# Patient Record
Sex: FEMALE | Race: WHITE | Employment: FULL TIME | URBAN - METROPOLITAN AREA
[De-identification: names, ages, dates, MRNs, and addresses within clinical notes are randomized per-mention and may not be internally consistent; named-entity substitution may affect disease eponyms.]

---

## 2018-01-09 ENCOUNTER — HOSPITAL ENCOUNTER (OUTPATIENT)
Dept: MAMMOGRAPHY | Age: 58
Discharge: HOME | End: 2018-01-09
Attending: OBSTETRICS & GYNECOLOGY | Admitting: OBSTETRICS & GYNECOLOGY

## 2018-01-09 DIAGNOSIS — Z12.31 VISIT FOR SCREENING MAMMOGRAM: ICD-10-CM

## 2021-09-07 ENCOUNTER — HOSPITAL ENCOUNTER (OUTPATIENT)
Dept: MRI IMAGING | Age: 61
Discharge: HOME | End: 2021-09-07
Payer: COMMERCIAL

## 2021-09-07 DIAGNOSIS — M51.36 DEGENERATION OF LUMBAR INTERVERTEBRAL DISC: ICD-10-CM

## 2021-09-07 PROCEDURE — 72148 MRI LUMBAR SPINE W/O DYE: CPT

## 2025-02-08 ENCOUNTER — HOSPITAL ENCOUNTER (EMERGENCY)
Age: 65
Discharge: HOME | End: 2025-02-08
Attending: INTERNAL MEDICINE
Payer: COMMERCIAL

## 2025-02-08 ENCOUNTER — APPOINTMENT (OUTPATIENT)
Dept: GENERAL RADIOLOGY | Age: 65
End: 2025-02-08
Payer: COMMERCIAL

## 2025-02-08 VITALS
OXYGEN SATURATION: 97 % | WEIGHT: 138 LBS | TEMPERATURE: 98.7 F | RESPIRATION RATE: 18 BRPM | HEART RATE: 98 BPM | DIASTOLIC BLOOD PRESSURE: 71 MMHG | SYSTOLIC BLOOD PRESSURE: 114 MMHG

## 2025-02-08 DIAGNOSIS — R06.02 SHORTNESS OF BREATH: ICD-10-CM

## 2025-02-08 DIAGNOSIS — U07.1 COVID-19: Primary | ICD-10-CM

## 2025-02-08 DIAGNOSIS — J44.1 COPD EXACERBATION (HCC): ICD-10-CM

## 2025-02-08 LAB
ANION GAP SERPL CALCULATED.3IONS-SCNC: 15 MMOL/L (ref 3–16)
BACTERIA URNS QL MICRO: NORMAL /HPF
BASOPHILS # BLD: 0 K/UL (ref 0–0.2)
BASOPHILS NFR BLD: 0.3 %
BILIRUB UR QL STRIP.AUTO: NEGATIVE
BUN SERPL-MCNC: 14 MG/DL (ref 7–20)
CALCIUM SERPL-MCNC: 9.2 MG/DL (ref 8.3–10.6)
CHLORIDE SERPL-SCNC: 96 MMOL/L (ref 99–110)
CLARITY UR: CLEAR
CO2 SERPL-SCNC: 22 MMOL/L (ref 21–32)
COLOR UR: YELLOW
CREAT SERPL-MCNC: 0.8 MG/DL (ref 0.6–1.2)
DEPRECATED RDW RBC AUTO: 13.9 % (ref 12.4–15.4)
EOSINOPHIL # BLD: 0 K/UL (ref 0–0.6)
EOSINOPHIL NFR BLD: 0.3 %
EPI CELLS #/AREA URNS AUTO: 5 /HPF (ref 0–5)
GFR SERPLBLD CREATININE-BSD FMLA CKD-EPI: 82 ML/MIN/{1.73_M2}
GLUCOSE SERPL-MCNC: 81 MG/DL (ref 70–99)
GLUCOSE UR STRIP.AUTO-MCNC: NEGATIVE MG/DL
HCT VFR BLD AUTO: 37.6 % (ref 36–48)
HGB BLD-MCNC: 12.6 G/DL (ref 12–16)
HGB UR QL STRIP.AUTO: ABNORMAL
HYALINE CASTS #/AREA URNS AUTO: 2 /LPF (ref 0–8)
KETONES UR STRIP.AUTO-MCNC: 15 MG/DL
LACTATE BLDV-SCNC: 0.4 MMOL/L (ref 0.4–1.9)
LEUKOCYTE ESTERASE UR QL STRIP.AUTO: NEGATIVE
LYMPHOCYTES # BLD: 1.3 K/UL (ref 1–5.1)
LYMPHOCYTES NFR BLD: 11.6 %
MCH RBC QN AUTO: 29.9 PG (ref 26–34)
MCHC RBC AUTO-ENTMCNC: 33.4 G/DL (ref 31–36)
MCV RBC AUTO: 89.6 FL (ref 80–100)
MONOCYTES # BLD: 1 K/UL (ref 0–1.3)
MONOCYTES NFR BLD: 8.5 %
NEUTROPHILS # BLD: 9.1 K/UL (ref 1.7–7.7)
NEUTROPHILS NFR BLD: 79.3 %
NITRITE UR QL STRIP.AUTO: NEGATIVE
PH UR STRIP.AUTO: 5.5 [PH] (ref 5–8)
PLATELET # BLD AUTO: 501 K/UL (ref 135–450)
PMV BLD AUTO: 6.8 FL (ref 5–10.5)
POTASSIUM SERPL-SCNC: 4.9 MMOL/L (ref 3.5–5.1)
PROCALCITONIN SERPL IA-MCNC: 2.14 NG/ML (ref 0–0.15)
PROT UR STRIP.AUTO-MCNC: ABNORMAL MG/DL
RBC # BLD AUTO: 4.2 M/UL (ref 4–5.2)
RBC CLUMPS #/AREA URNS AUTO: 3 /HPF (ref 0–4)
SODIUM SERPL-SCNC: 133 MMOL/L (ref 136–145)
SP GR UR STRIP.AUTO: 1.01 (ref 1–1.03)
UA COMPLETE W REFLEX CULTURE PNL UR: ABNORMAL
UA DIPSTICK W REFLEX MICRO PNL UR: YES
URN SPEC COLLECT METH UR: ABNORMAL
UROBILINOGEN UR STRIP-ACNC: 0.2 E.U./DL
WBC # BLD AUTO: 11.4 K/UL (ref 4–11)
WBC #/AREA URNS AUTO: 2 /HPF (ref 0–5)

## 2025-02-08 PROCEDURE — 85025 COMPLETE CBC W/AUTO DIFF WBC: CPT

## 2025-02-08 PROCEDURE — 71046 X-RAY EXAM CHEST 2 VIEWS: CPT

## 2025-02-08 PROCEDURE — 6370000000 HC RX 637 (ALT 250 FOR IP): Performed by: PHYSICIAN ASSISTANT

## 2025-02-08 PROCEDURE — 94640 AIRWAY INHALATION TREATMENT: CPT

## 2025-02-08 PROCEDURE — 80048 BASIC METABOLIC PNL TOTAL CA: CPT

## 2025-02-08 PROCEDURE — 96374 THER/PROPH/DIAG INJ IV PUSH: CPT

## 2025-02-08 PROCEDURE — 83605 ASSAY OF LACTIC ACID: CPT

## 2025-02-08 PROCEDURE — 2500000003 HC RX 250 WO HCPCS: Performed by: PHYSICIAN ASSISTANT

## 2025-02-08 PROCEDURE — 84145 PROCALCITONIN (PCT): CPT

## 2025-02-08 PROCEDURE — 81001 URINALYSIS AUTO W/SCOPE: CPT

## 2025-02-08 PROCEDURE — 6360000002 HC RX W HCPCS: Performed by: PHYSICIAN ASSISTANT

## 2025-02-08 PROCEDURE — 99285 EMERGENCY DEPT VISIT HI MDM: CPT

## 2025-02-08 PROCEDURE — 93005 ELECTROCARDIOGRAM TRACING: CPT | Performed by: PHYSICIAN ASSISTANT

## 2025-02-08 RX ORDER — IPRATROPIUM BROMIDE AND ALBUTEROL SULFATE 2.5; .5 MG/3ML; MG/3ML
1 SOLUTION RESPIRATORY (INHALATION) ONCE
Status: COMPLETED | OUTPATIENT
Start: 2025-02-08 | End: 2025-02-08

## 2025-02-08 RX ORDER — BENZONATATE 100 MG/1
200 CAPSULE ORAL ONCE
Status: COMPLETED | OUTPATIENT
Start: 2025-02-08 | End: 2025-02-08

## 2025-02-08 RX ORDER — LEVOTHYROXINE SODIUM 100 UG/1
1 TABLET ORAL EVERY MORNING
COMMUNITY

## 2025-02-08 RX ORDER — HYDROXYCHLOROQUINE SULFATE 200 MG/1
200 TABLET, FILM COATED ORAL
COMMUNITY
Start: 2025-02-05

## 2025-02-08 RX ORDER — PREDNISONE 20 MG/1
40 TABLET ORAL DAILY
Qty: 10 TABLET | Refills: 0 | Status: SHIPPED | OUTPATIENT
Start: 2025-02-08 | End: 2025-02-08 | Stop reason: CLARIF

## 2025-02-08 RX ADMIN — IPRATROPIUM BROMIDE AND ALBUTEROL SULFATE 1 DOSE: .5; 3 SOLUTION RESPIRATORY (INHALATION) at 17:49

## 2025-02-08 RX ADMIN — METHYLPREDNISOLONE SODIUM SUCCINATE 125 MG: 125 INJECTION INTRAMUSCULAR; INTRAVENOUS at 16:12

## 2025-02-08 RX ADMIN — BENZONATATE 200 MG: 100 CAPSULE ORAL at 16:12

## 2025-02-08 ASSESSMENT — ENCOUNTER SYMPTOMS
STRIDOR: 0
ABDOMINAL PAIN: 0
VOMITING: 0
EYE DISCHARGE: 0
DIARRHEA: 0
COUGH: 1
RHINORRHEA: 0
BACK PAIN: 0
EYE ITCHING: 0
EYE REDNESS: 0
SHORTNESS OF BREATH: 1
SORE THROAT: 0
NAUSEA: 0

## 2025-02-08 ASSESSMENT — LIFESTYLE VARIABLES: HOW OFTEN DO YOU HAVE A DRINK CONTAINING ALCOHOL: NEVER

## 2025-02-08 NOTE — ED NOTES
Ambulatory SPO2/Pulse:    At rest:  98 bpm  97%    While walking from [place] [facility] to room 31:  125 bpm  98%

## 2025-02-08 NOTE — ED PROVIDER NOTES
[facility] EMERGENCY DEPARTMENT  EMERGENCY DEPARTMENT ENCOUNTER        Pt Name: Nikki Stoner  MRN: [id number]  Birthdate 1960  Date of evaluation: 2/8/2025  Provider: JENNY Wang  PCP: More Abad APN  Note Started: 5:12 PM EST 2/8/25       I have seen and evaluated this patient with my supervising physician Zander Gutierrez DO.      CHIEF COMPLAINT       Chief Complaint   Patient presents with    Positive For Covid-19     Pt states she tested positive for Covid on Tuesday, has increased SOB that concerns her. 96% in triage        HISTORY OF PRESENT ILLNESS: 1 or more Elements     History From: Patient, , EMR review  Limitations to history : None    Nikki Stoner is a 64 y.o. female with history of daily smoking who presents to the emergency department with concern for shortness of breath in relation to recently diagnosed COVID-19.  Patient is a daily smoker, and has been for years.  She has no known COPD diagnosis.  Patient was diagnosed with COVID 4 days ago by her PCP.  She was advised to monitor her O2 saturations.  Over the last couple of days she has had increased shortness of breath, and today her oxygen saturation at home was 89%, so she came in for further evaluation and due to concern for pneumonia.  Patient was prescribed an albuterol inhaler by her PCP.  She has used it a couple of times, but has not felt as though it is really helping.  Patient is denying any chest pain.  She reports a nonproductive cough, although feels as though she has chest congestion.  Patient denies any fever at home.  She denies GI symptoms or lower extremity edema.  No other acute complaints.    On EMR review, PCP does list COPD with acute exacerbation as a diagnosis from office visit 2//25.    Nursing Notes were all reviewed and agreed with or any disagreements were addressed in the HPI.    REVIEW OF SYSTEMS :      Review of Systems   Constitutional:  Negative for chills, diaphoresis and  initially mildly tachycardic on arrival, but still does not meet 2+ SIRS criteria, and I do not suspect sepsis.  Lactate is not elevated.  She does have a procalcitonin of 2.14.  However, in isolation, this is unlikely to be of clinical significance at this time.  Chest x-ray does not show evidence of pneumonia, patient has a minimal elevated white count has known COVID infection.  We suspect the elevated procalcitonin is related to her COVID.    Patient remains normoxic during her time in the ED.  I feel she is appropriate for outpatient management.  Patient is agreeable with this.  Advised to follow-up closely with PCP.  She is discharged in stable condition with return precautions.    Patient seen in collaboration with attending, Dr. Gutierrez.  Please see his note for further details.    Disposition Considerations (tests considered but not done, Admit vs D/C, Shared Decision Making, Pt Expectation of Test or Tx.): Patient's oxygen saturations are good.  I do not believe the patient's symptoms today are due to pulmonary embolism, pulmonary edema, pneumonia, pneumothorax, ACS, CHF, status asthmaticus, acute respiratory failure, profound anemia or metabolic abnormality, amongst other more emergent diagnostic considerations.  Patient was advised to immediately return to emergency department if symptoms worsen.          I am the Primary Clinician of Record.  FINAL IMPRESSION      1. COVID-19    2. COPD exacerbation (HCC)    3. Shortness of breath          DISPOSITION/PLAN     DISPOSITION Decision To Discharge 02/08/2025 07:31:23 PM   DISPOSITION CONDITION Stable           PATIENT REFERRED TO:  More Abad APN  [address] [address] [phone]    Schedule an appointment as soon as possible for a visit in 3 days      [facility] Emergency Department  [address] [facility] [address] [phone]    As needed, If symptoms worsen      DISCHARGE

## 2025-02-09 LAB
EKG ATRIAL RATE: 95 BPM
EKG DIAGNOSIS: NORMAL
EKG P AXIS: 80 DEGREES
EKG P-R INTERVAL: 166 MS
EKG Q-T INTERVAL: 346 MS
EKG QRS DURATION: 64 MS
EKG QTC CALCULATION (BAZETT): 434 MS
EKG R AXIS: 53 DEGREES
EKG T AXIS: 75 DEGREES
EKG VENTRICULAR RATE: 95 BPM

## 2025-02-09 PROCEDURE — 93010 ELECTROCARDIOGRAM REPORT: CPT | Performed by: INTERNAL MEDICINE

## 2025-02-09 NOTE — ED PROVIDER NOTES
In addition to the advanced practice provider, I personally saw Nikki Stoner and performed a substantive portion of the visit including all aspects of the medical decision making.    Medical Decision Making  64-year-old female comes in today with shortness of breath and recently was diagnosed with COVID-19.  Patient does smoke daily.  Patient states that shortness of breath has increased over the past couple days.  Her oxygen at home was found to be 89%.  She was concerned about a pneumonia.  Patient used an inhaler by her primary care physician a couple times.  She denies any chest pain.  Patient denies fever and chills.  Patient denies dizziness and palpitations.  Sodium is mildly low at 133 and chloride is mildly low at 96.  Procalcitonin is 2.14.  Platelet count is elevated at 501.  White blood cell count is slightly elevated at 11.4.  Chest x-ray did not show any acute findings.  Patient is not septic or toxic in appearance.  Despite the elevated procalcitonin I do not believe the patient needs antibiotics.  Patient is not hypoxic here in the ER.  Patient is stable for discharge.  I agree with the assessment and plan of the PA.        EKG  The Ekg interpreted by me in the absence of a cardiologist shows.  normal sinus rhythm with a rate of 95  Axis is   Normal  QTc is  normal  Intervals and Durations are unremarkable.      No specific ST-T wave changes appreciated.  No evidence of acute ischemia.     SEP-1  Is this patient to be included in the SEP-1 Core Measure due to severe sepsis or septic shock?   No    Screenings     [place] Coma Scale  Eye Opening: Spontaneous  Best Verbal Response: Oriented  Best Motor Response: Obeys commands  [place] Coma Scale Score: 15             Patient Referrals:  More Abad APN  [address] [address] [phone]    Schedule an appointment as soon as possible for a visit in 3 days      [facility]